# Patient Record
Sex: MALE
[De-identification: names, ages, dates, MRNs, and addresses within clinical notes are randomized per-mention and may not be internally consistent; named-entity substitution may affect disease eponyms.]

---

## 2023-05-10 ENCOUNTER — HOSPITAL ENCOUNTER (EMERGENCY)
Dept: HOSPITAL 97 - ER | Age: 58
Discharge: HOME | End: 2023-05-10
Payer: COMMERCIAL

## 2023-05-10 VITALS — SYSTOLIC BLOOD PRESSURE: 139 MMHG | OXYGEN SATURATION: 98 % | DIASTOLIC BLOOD PRESSURE: 77 MMHG | TEMPERATURE: 98.1 F

## 2023-05-10 DIAGNOSIS — I10: ICD-10-CM
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
## 2023-05-10 DIAGNOSIS — M25.552: Primary | ICD-10-CM

## 2023-05-10 DIAGNOSIS — M25.551: ICD-10-CM

## 2023-05-10 DIAGNOSIS — E11.9: ICD-10-CM

## 2023-05-10 PROCEDURE — 73521 X-RAY EXAM HIPS BI 2 VIEWS: CPT
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
## 2023-05-10 NOTE — XMS REPORT
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



               00                                 by mouth           [facility]



                                                  nightly as           l



                                                  needed           

 

     Ofloxacin      2022- No             5[drp]      Place 5           [facility]

[facility]



     (Floxin      2-19 04-06                          drops into           [facility]

ld



     Otic) 0.3 %      00:00: 00:00                          the right           

-



     otic      00   :00                           ear 2           [facility]



     Solution                                         times           l



                                                  daily Use           



                                                  for 5 days           

 

     Ibuprofen      2022      Yes       [id number] 600mg Q.[id number] Take 1    

       [facility]



     600 MG oral      2-01                          [id number] tablet           

[facility]



     Tablet      00:00:                          3D   (600 mg           -



               00                                 total) by           [facility]



                                                  mouth           l



                                                  every 8           



                                                  hours as           



                                                  needed FOR           



                                                  PAIN           

 

     Gabapentin      2022      Yes       [id number] 100mg      Take 1          

 [facility]



     100 MG oral      2-01                               capsule           [facility]

ld



     Capsule      00:00:                               (100 mg           -



               00                                 total) by           [facility]



                                                  mouth 3           l



                                                  times           



                                                  daily           

 

     Ibuprofen      2022      Yes       [id number] 600mg [id number] Take 1    

       [facility]



     600 MG oral      2-01                          [id number] tablet           

[facility]



     Tablet      00:00:                          3D   (600 mg           -



               00                                 total) by           [facility]



                                                  mouth           l



                                                  every 8           



                                                  hours as           



                                                  needed FOR           



                                                  PAIN           

 

     Ibuprofen      2022      Yes       [id number] 600mg Q.[id number] Take 1    

       [facility]



     600 MG oral      2-01                          [id number] tablet           

[facility]



     Tablet      00:00:                          3D   (600 mg           -



               00                                 total) by           [facility]



                                                  mouth           l



                                                  every 8           



                                                  hours as           



                                                  needed FOR           



                                                  PAIN           

 

     Ibuprofen      2022      Yes       [id number] 600mg Q.[id number] Take 1    

       [facility]



     600 MG oral      2-01                          [id number] tablet           

[facility]



     Tablet      00:00:                          3D   (600 mg           -



               00                                 total) by           [facility]



                                                  mouth           l



                                                  every 8           



                                                  hours as           



                                                  needed FOR           



                                                  PAIN           

 

     Gabapentin      2022- No        [id number] 100mg      Take 1         

  [facility]



     100 MG oral      2-01 12-19                          capsule           [facility]

[facility]



     Capsule      00:00: 00:00                          (100 mg           -



               00   :00                           total) by           [facility]



                                                  mouth 3           l



                                                  times           



                                                  daily           

 

     glipiZIDE 5      2022      Yes       [id number]           TAKE 1           

[facility]



     MG oral      1-30                               TABLET BY           [facility]



     Tablet      00:00:                               MOUTH ONCE           -



               00                                 DAILY           [facility]



                                                  BEFORE A           l



                                                  MEAL           

 

     glipiZIDE 5      2022- No        [id number]           TAKE 1          

 [facility]



     MG oral      1-30 -19                          TABLET BY           [facility]

d



     Tablet      00:00: 00:00                          MOUTH ONCE           -



               00   :00                           DAILY           [facility]



                                                  BEFORE A           l



                                                  MEAL           

 

     ondansetron      2022- No             4mg       4 mg, Slow          

 [facility]



     (ZOFRAN                                IV Push,           ity of



     (PF))      02:00: 01:55                          ONCE, 1           [place]



     injection 4      00   :00                           dose, On           [facility]

nu[facility]



     mg                                           Tue            [facility]



                                                  22           



                                                  at 2000,           



                                                  ASAP           

 

     morpHINE (4      2022- No             4mg       4 mg, Slow          

 [facility]



     mg/mL)                                IV Push,           ity of



     injection 4      02:00: 01:57                          ONCE, 1           [facility]

[facility]



     mg        00   :00                           dose, On           [facility]



                                                  Tue            [facility]



                                                  22           



                                                  at 2000,           



                                                  ASAP           

 

     Metformin      -0      Yes       [id number] 1000mg      Take 2           

[facility]



     HCl 500 MG      9-19                               tablets           [facility]

d



     oral Tablet      00:00:                               (1,000 mg           -



               00                                 total) by           [facility]



                                                  mouth in           l



                                                  the            



                                                  morning           



                                                  and 2           



                                                  tablets           



                                                  (1,000 mg           



                                                  total) in           



                                                  the            



                                                  evening.           



                                                  Take with           



                                                  meals.           

 

     Metformin            Yes       [id number] 1000mg      Take 2           

[facility]



     HCl 500 MG      9-19                               tablets           [facility]

d



     oral Tablet      00:00:                               (1,000 mg           -



               00                                 total) by           [facility]



                                                  mouth in           l



                                                  the            



                                                  morning           



                                                  and 2           



                                                  tablets           



                                                  (1,000 mg           



                                                  total) in           



                                                  the            



                                                  evening.           



                                                  Take with           



                                                  meals.           

 

     Metformin            Yes       [id number] 1000mg      Take 2           

[facility]



     HCl 500 MG      9-19                               tablets           [facility]

d



     oral Tablet      00:00:                               (1,000 mg           -



               00                                 total) by           [facility]



                                                  mouth in           l



                                                  the            



                                                  morning           



                                                  and 2           



                                                  tablets           



                                                  (1,000 mg           



                                                  total) in           



                                                  the            



                                                  evening.           



                                                  Take with           



                                                  meals.           

 

     Metformin            Yes       [id number] 1000mg      Take 2           

[facility]



     HCl 500 MG      9-19                               tablets           [facility]

d



     oral Tablet      00:00:                               (1,000 mg           -



               00                                 total) by           [facility]



                                                  mouth in           l



                                                  the            



                                                  morning           



                                                  and 2           



                                                  tablets           



                                                  (1,000 mg           



                                                  total) in           



                                                  the            



                                                  evening.           



                                                  Take with           



                                                  meals.           

 

     HYDROcodone      2022- No             1{tbl}      1 tablet,         

  [facility]



     -acetaminop                                Oral,           ity of



     hen (NORCO      23:45: 23:53                          ONCE, 1           [facility]

as



     5) 5-325 mg      00   :00                           dose, On           Medi

nuha



     tablet 1                                         Sat            [facility]



     tablet                                         22 at           



                                                  1845, ASAP           

 

     Dapaglifloz            Yes       [id number] 5mg       Take 5 mg        

   [facility]



     in                                       by mouth           [facility]



     Propanediol      00:00:                               daily           -



     (Farxiga) 5      00                                                [facility]



     MG oral                                                        l



     Tablet                                                        

 

     Dapaglifloz      2022- No        [id number] 5mg       Take 5 mg       

    [facility]



     in                                  by mouth           [facility]



     Propanediol      00:00: 00:00                          daily           -



     (Farxiga) 5      00   :00                                          [facility]



     MG oral                                                        l



     Tablet                                                        

 

     Ibuprofen      2022- No        [id number]           TAKE 1           

[facility]



     600 MG oral                                TABLET BY           [facility]

[facility]



     Tablet      00:00: 00:00                          MOUTH           -



               00   :00                           EVERY 8           [facility]



                                                  HOURS AS           l



                                                  NEEDED FOR           



                                                  PAIN           

 

     Gabapentin      2022- No                       TAKE 1           [facility]

ey



     100 MG oral                                CAPSULE BY           S

[facility]



     Capsule      00:00: 00:00                          MOUTH           -



               00   :00                           THREE           [facility]



                                                  TIMES           l



                                                  DAILY           

 

     Lisinopril      0      Yes       [id number] 20mg      Take 1           [facility]

[facility]



     20 MG oral      1-19                               tablet (20           [facility]

[facility]



     Tablet      00:00:                               mg total)           -



               00                                 by mouth           [facility]



                                                  daily           l

 

     Atorvastati            Yes       [id number] 10mg      Take 1           

[facility]



     n Calcium      1-19                               tablet (10           [facility]

[facility]



     10 MG oral      00:00:                               mg total)           -



     Tablet      00                                 by mouth           [facility]



                                                  daily           l

 

     Lisinopril            Yes       [id number] 20mg      Take 1           [facility]

[facility]



     20 MG oral      1-19                               tablet (20           [facility]

[facility]



     Tablet      00:00:                               mg total)           -



               00                                 by mouth           [facility]



                                                  daily           l

 

     Atorvastati            Yes       [id number] 10mg      Take 1           

[facility]



     n Calcium      1-19                               tablet (10           [facility]

[facility]



     10 MG oral      00:00:                               mg total)           -



     Tablet      00                                 by mouth           [facility]



                                                  daily           l

 

     Lisinopril      0      Yes       [id number] 20mg      Take 1           [facility]

[facility]



     20 MG oral      1-19                               tablet (20           [facility]

[facility]



     Tablet      00:00:                               mg total)           -



               00                                 by mouth           [facility]



                                                  daily           l

 

     Atorvastati            Yes       [id number] 10mg      Take 1           

[facility]



     n Calcium      1-19                               tablet (10           [facility]

[facility]



     10 MG oral      00:00:                               mg total)           -



     Tablet      00                                 by mouth           [facility]



                                                  daily           l

 

     Lisinopril      0      Yes       [id number] 20mg      Take 1           [facility]

[facility]



     20 MG oral      1-19                               tablet (20           [facility]

[facility]



     Tablet      00:00:                               mg total)           



               00                                 by mouth           



                                                  daily           

 

     Atorvastati      0      Yes       [id number] 10mg      Take 1           

[facility]



     n Calcium      1-19                               tablet (10           [facility]

[facility]



     10 MG oral      00:00:                               mg total)           



     Tablet      00                                 by mouth           



                                                  daily           

 

     Metformin      0      Yes       [id number] 500mg      Take 1           [facility]

[facility]



     HCl 500 MG      1-19                               tablet           [facility]



     oral Tablet      00:00:                               (500 mg           



               00                                 total) by           



                                                  mouth 2           



                                                  times           



                                                  daily           



                                                  (with           



                                                  meals)           

 

     glipiZIDE 5      -0      Yes       [id number] 5mg       Take 1           

[facility]



     MG oral      1-19                               tablet (5           [facility]



     Tablet      00:00:                               mg total)           



               00                                 by mouth           



                                                  daily           



                                                  (before a           



                                                  meal)           

 

     Ibuprofen      -0      Yes       [id number] 600mg Q8H  Take 1           

[facility]



     600 MG oral      1-19                               tablet           [facility]

d



     Tablet      00:00:                               (600 mg           



               00                                 total) by           



                                                  mouth           



                                                  every 8           



                                                  hours as           



                                                  needed for           



                                                  pain           

 

     Lisinopril            Yes       [id number] 20mg      Take 1           [facility]

[facility]



     20 MG oral      1-19                               tablet (20           [facility]

[facility]



     Tablet      00:00:                               mg total)           



               00                                 by mouth           



                                                  daily           

 

     Atorvastati            Yes       [id number] 10mg      Take 1           

[facility]



     n Calcium      1-19                               tablet (10           [facility]

[facility]



     10 MG oral      00:00:                               mg total)           



     Tablet      00                                 by mouth           



                                                  daily           

 

     Metformin      0      Yes       [id number] 500mg      Take 1           [facility]

[facility]



     HCl 500 MG      1-19                               tablet           [facility]



     oral Tablet      00:00:                               (500 mg           



               00                                 total) by           



                                                  mouth 2           



                                                  times           



                                                  daily           



                                                  (with           



                                                  meals)           

 

     glipiZIDE 5            Yes       [id number] 5mg       Take 1           

[facility]



     MG oral      1-19                               tablet (5           [facility]



     Tablet      00:00:                               mg total)           



               00                                 by mouth           



                                                  daily           



                                                  (before a           



                                                  meal)           

 

     Ibuprofen      0      Yes       [id number] 600mg Q.[id number] Take 1     

      [facility]



     600 MG oral      1-19                          [id number] tablet           

[facility]



     Tablet      00:00:                          3D   (600 mg           



               00                                 total) by           



                                                  mouth           



                                                  every 8           



                                                  hours as           



                                                  needed for           



                                                  pain           

 

     HYDROcodone            Yes                      TAKE 1           [facility]

[facility]



     -Acetaminop      1-12                               TABLET BY           [facility]



     hen 7.5-325      00:00:                               MOUTH           -



     MG oral      00                                 TWICE           [facility]



     Tablet                                         DAILY FOR           l



                                                  28 DAYS           

 

     Tizanidine      0      Yes                      TAKE 1           [facility]

y



     HCl 4 MG      1-12                               TABLET BY           [facility]

d



     oral Tablet      00:00:                               MOUTH           -



               00                                 TWICE           [facility]



                                                  DAILY FOR           l



                                                  28 DAYS           

 

     HYDROcodone      -      Yes                      TAKE 1           [facility]

[facility]



     -Acetaminop      1-12                               TABLET BY           [facility]

[facility]



     hen 7.5-325      00:00:                               MOUTH           -



     MG oral      00                                 TWICE           [facility]



     Tablet                                         DAILY FOR           l



                                                  28 DAYS           

 

     Tizanidine            Yes                      TAKE 1           [facility]

y



     HCl 4 MG      1-12                               TABLET BY           [facility]

d



     oral Tablet      00:00:                               MOUTH           -



               00                                 TWICE           [facility]



                                                  DAILY FOR           l



                                                  28 DAYS           

 

     HYDROcodone            Yes                      TAKE 1           [facility]

[facility]



     -Acetaminop      1-12                               TABLET BY           [facility]

[facility]



     hen 7.5-325      00:00:                               MOUTH           -



     MG oral      00                                 TWICE           [facility]



     Tablet                                         DAILY FOR           l



                                                  28 DAYS           

 

     Tizanidine            Yes                      TAKE 1           [facility]

y



     HCl 4 MG      1-12                               TABLET BY           [facility]

d



     oral Tablet      00:00:                               MOUTH           -



               00                                 TWICE           [facility]



                                                  DAILY FOR           l



                                                  28 DAYS           

 

     HYDROcodone            Yes                      TAKE 1           [facility]

[facility]



     -Acetaminop      1-12                               TABLET BY           [facility]

[facility]



     hen 7.5-325      00:00:                               MOUTH           -



     MG oral      00                                 TWICE           [facility]



     Tablet                                         DAILY FOR           l



                                                  28 DAYS           

 

     Tizanidine            Yes                      TAKE 1           [facility]

y



     HCl 4 MG      1-12                               TABLET BY           [facility]

d



     oral Tablet      00:00:                               MOUTH           -



               00                                 TWICE           [facility]



                                                  DAILY FOR           l



                                                  28 DAYS           

 

     HYDROcodone            Yes                      TAKE 1           [facility]

[facility]



     -Acetaminop      1-12                               TABLET BY           [facility]

[facility]



     hen 7.5-325      00:00:                               MOUTH           



     MG oral      00                                 TWICE           



     Tablet                                         DAILY FOR           



                                                  28 DAYS           

 

     Tizanidine            Yes                      TAKE 1           [facility]

y



     HCl 4 MG      1-12                               TABLET BY           [facility]

d



     oral Tablet      00:00:                               MOUTH           



               00                                 TWICE           



                                                  DAILY FOR           



                                                  28 DAYS           

 

     HYDROcodone      0      Yes                      TAKE 1           [facility]

[facility]



     -Acetaminop      1-12                               TABLET BY           [facility]

[facility]



     hen 7.5-325      00:00:                               MOUTH           



     MG oral      00                                 TWICE           



     Tablet                                         DAILY FOR           



                                                  28 DAYS           

 

     Tizanidine            Yes                      TAKE 1           [facility]

y



     HCl 4 MG      1-12                               TABLET BY           [facility]

d



     oral Tablet      00:00:                               MOUTH           



               00                                 TWICE           



                                                  DAILY FOR           



                                                  28 DAYS           

 

     Polyethyl            Yes       [id number] 2[drp]      Place 2         

  [facility]



     Glycol-Prop      9-09                               drops into           [facility]



     yl Glycol      00:00:                               the right           -



     (EQ       00                                 eye as           [facility]



     Lubricant                                         needed           l



     Eye Drops)                                                        



     0.4-0.3 %                                                        



     ophthalmic                                                        



     Solution                                                        

 

     Polyethyl            Yes       [id number] 2[drp]      Place 2         

  [facility]



     Glycol-Prop      9-09                               drops into           Se

[facility]



     yl Glycol      00:00:                               the right           -



     (EQ       00                                 eye as           [facility]



     Lubricant                                         needed           l



     Eye Drops)                                                        



     0.4-0.3 %                                                        



     ophthalmic                                                        



     Solution                                                        

 

     Polyethyl      -      Yes       [id number] 2[drp]      Place 2         

  [facility]



     Glycol-Prop      9-09                               drops into           Se

[facility]



     yl Glycol      00:00:                               the right           -



     (EQ       00                                 eye as           [facility]



     Lubricant                                         needed           l



     Eye Drops)                                                        



     0.4-0.3 %                                                        



     ophthalmic                                                        



     Solution                                                        

 

     Atorvastati      0      Yes       [id number] 10mg      Take 1           

[facility]



     n Calcium      9-09                               tablet (10           [facility]

[facility]



     10 MG oral      00:00:                               mg total)           



     Tablet      00                                 by mouth           



                                                  daily           

 

     Lisinopril            Yes       [id number] 20mg      Take 1           [facility]

[facility]



     20 MG oral      9-09                               tablet (20           [facility]

[facility]



     Tablet      00:00:                               mg total)           



               00                                 by mouth           



                                                  daily           

 

     Metformin      -0      Yes       [id number] 500mg      Take 1           [facility]

[facility]



     HCl 500 MG      9-09                               tablet           [facility]



     oral Tablet      00:00:                               (500 mg           



               00                                 total) by           



                                                  mouth 2           



                                                  times           



                                                  daily           



                                                  (with           



                                                  meals)           

 

     glipiZIDE 5            Yes       [id number] 5mg       Take 1           

[facility]



     MG oral      9-09                               tablet (5           [facility]



     Tablet      00:00:                               mg total)           



               00                                 by mouth           



                                                  daily           



                                                  (before a           



                                                  meal)           

 

     Ibuprofen            Yes       [id number] 600mg Q6H  Take 1           

[facility]



     600 MG oral      9-09                               tablet           [facility]

d



     Tablet      00:00:                               (600 mg           



               00                                 total) by           



                                                  mouth           



                                                  every 6           



                                                  hours as           



                                                  needed           

 

     Polyethyl      -      Yes       [id number] 2[drp]      Place 2         

  [facility]



     Glycol-Prop      9-09                               drops into           Se

[facility]



     yl Glycol      00:00:                               the right           



     (EQ       00                                 eye as           



     Lubricant                                         needed           



     Eye Drops)                                                        



     0.4-0.3 %                                                        



     ophthalmic                                                        



     Solution                                                        

 

     Polyethyl      -0      Yes       [id number] 2[drp]      Place 2         

  [facility]



     Glycol-Prop      9-09                               drops into           Se

[facility]



     yl Glycol      00:00:                               the right           



     (EQ       00                                 eye as           



     Lubricant                                         needed           



     Eye Drops)                                                        



     0.4-0.3 %                                                        



     ophthalmic                                                        



     Solution                                                        

 

     Polyethyl      -0      Yes       [id number] 2[drp]      Place 2         

  [facility]



     Glycol-Prop      9-09                               drops into           Se

[facility]



     yl Glycol      00:00:                               the right           



     (EQ       00                                 eye as           



     Lubricant                                         needed           



     Eye Drops)                                                        



     0.4-0.3 %                                                        



     ophthalmic                                                        



     Solution                                                        

 

     Polyethyl      2023- No        [id number] 2[drp]      Place 2        

   [facility]



     Glycol-Prop                                drops into           S

[facility]



     yl Glycol      00:00: 00:00                          the right           -



     (EQ       00   :00                           eye as           [facility]



     Lubricant                                         needed           l



     Eye Drops)                                                        



     0.4-0.3 %                                                        



     ophthalmic                                                        



     Solution                                                        

 

     Atorvastati      2022- No        [id number] 10mg      Take 1          

 [facility]



     n Calcium                                tablet (10           [facility]

[facility]



     10 MG oral      00:00: 00:00                          mg total)           



     Tablet      00   :00                           by mouth           



                                                  daily           

 

     Lisinopril      2022- No        [id number] 20mg      Take 1           

[facility]



     20 MG oral                                tablet (20           [facility]

[facility]



     Tablet      00:00: 00:00                          mg total)           



               00   :00                           by mouth           



                                                  daily           

 

     Metformin      2022- No        [id number] 500mg      Take 1           

[facility]



     HCl 500 MG                                tablet           [facility]

d



     oral Tablet      00:00: 00:00                          (500 mg           



               00   :00                           total) by           



                                                  mouth 2           



                                                  times           



                                                  daily           



                                                  (with           



                                                  meals)           

 

     glipiZIDE 5      2022- No        [id number] 5mg       Take 1          

 [facility]



     MG oral                                tablet (5           [facility]

d



     Tablet      00:00: 00:00                          mg total)           



               00   :00                           by mouth           



                                                  daily           



                                                  (before a           



                                                  meal)           

 

     Ibuprofen      2022- No        [id number] 600mg Q6H  Take 1          

 [facility]



     600 MG oral                                tablet           [facility]

[facility]



     Tablet      00:00: 00:00                          (600 mg           



               00   :00                           total) by           



                                                  mouth           



                                                  every 6           



                                                  hours as           



                                                  needed           

 

     Gabapentin            Yes       [id number] 100mg      Take 1           

[facility]



     100 MG oral      7-12                               capsule           [facility]

ld



     Capsule      00:00:                               (100 mg           



               00                                 total) by           



                                                  mouth 3           



                                                  times           



                                                  daily           

 

     Gabapentin            Yes       [id number] 100mg      Take 1           

[facility]



     100 MG oral      7-12                               capsule           [facility]

[facility]



     Capsule      00:00:                               (100 mg           



               00                                 total) by           



                                                  mouth 3           



                                                  times           



                                                  daily           

 

     Gabapentin            Yes       [id number] 100mg      Take 1           

[facility]



     100 MG oral      7-12                               capsule           [facility]

[facility]



     Capsule      00:00:                               (100 mg           



               00                                 total) by           



                                                  mouth 3           



                                                  times           



                                                  daily           

 

     glipiZIDE 5      2021- No        [id number] 5mg       Take 1          

 [facility]



     MG oral                                tablet (5           [facility]

d



     Tablet      00:00: 00:00                          mg total)           



               00   :00                           by mouth           



                                                  daily           



                                                  (before a           



                                                  meal)           

 

     Lisinopril      2021- No        [id number] 20mg      Take 1           

[facility]



     20 MG oral                                tablet (20           [facility]

[facility]



     Tablet      00:00: 00:00                          mg total)           



               00   :00                           by mouth           



                                                  daily           

 

     Atorvastati      2021- No        [id number] 10mg      Take 1          

 [facility]



     n Calcium      -                          tablet (10           [facility]

[facility]



     10 MG oral      00:00: 00:00                          mg total)           



     Tablet      00   :00                           by mouth           



                                                  daily           

 

     Tramadol            Yes            1{tbl} Q.25D Take 1           [facility]

[facility]



     HCl 50 MG      4-08                               tablet by           [facility]

[facility]



     oral Tablet      00:00:                               mouth           -



               00                                 every 6           [facility]



                                                  hours as           l



                                                  needed           

 

     Temazepam      -0      Yes            30mg      Take 30 mg           [facility]

[facility]



     30 MG oral      4-08                               by mouth           [facility]

[facility]



     Capsule      00:00:                               at bedtime           -



               00                                                [facility]



                                                                 l

 

     Tramadol      -0      Yes            1{tbl} Q6H  Take 1           [facility]

y



     HCl 50 MG      4-08                               tablet by           [facility]

[facility]



     oral Tablet      00:00:                               mouth           



               00                                 every 6           



                                                  hours as           



                                                  needed           

 

     Temazepam      -0      Yes            30mg      Take 30 mg           [facility]

[facility]



     30 MG oral      4-08                               by mouth           [facility]

ld



     Capsule      00:00:                               at bedtime           



               00                                                

 

     Tramadol      -0      Yes            1{tbl} Q6H  Take 1           [facility]

y



     HCl 50 MG      4-08                               tablet by           [facility]

[facility]



     oral Tablet      00:00:                               mouth           



               00                                 every 6           



                                                  hours as           



                                                  needed           

 

     Temazepam      -0      Yes            30mg      Take 30 mg           [facility]

[facility]



     30 MG oral      4-08                               by mouth           [facility]

ld



     Capsule      00:00:                               at bedtime           



               00                                                

 

     Tramadol      -0      Yes            1{tbl} Q.25D Take 1           [facility]

[facility]



     HCl 50 MG      4-08                               tablet by           [facility]

[facility]



     oral Tablet      00:00:                               mouth           



               00                                 every 6           



                                                  hours as           



                                                  needed           

 

     Temazepam      -0      Yes            30mg      Take 30 mg           [facility]

[facility]



     30 MG oral      4-08                               by mouth           [facility]

ld



     Capsule      00:00:                               at bedtime           



               00                                                

 

     Tramadol      2021-0 2022- No             1{tbl} Q.25D Take 1           [facility]

[facility]



     HCl 50 MG                                tablet by           [facility]

[facility]



     oral Tablet      00:00: 00:00                          mouth           -



               00   :00                           every 6           [facility]



                                                  hours as           l



                                                  needed           

 

     Temazepam      2022- No             30mg      Take 30 mg           [facility]

[facility]



     30 MG oral                                by mouth           [facility]

[facility]



     Capsule      00:00: 00:00                          at bedtime           -



               00   :00                                          [facility]



                                                                 l

 

     ibuprofen      2020      Yes       [id number] 600mg      Take 1           

[facility]



     600 mg      1-12                               tablet by           [facility]



     tablet      00:00:                               mouth           [place]



               00                                 every 6           [facility]



                                                  (six)           [facility]



                                                  hours as           



                                                  needed for           



                                                  Pain           



                                                  (scale           



                                                  4-6).           

 

     ibuprofen      2020      Yes       [id number] 600mg      Take 1           

[facility]



     600 mg      1-12                               tablet by           [facility]



     tablet      00:00:                               mouth           [place]



               00                                 every 6           [facility]



                                                  (six)           [facility]



                                                  hours as           



                                                  needed for           



                                                  Pain           



                                                  (scale           



                                                  4-6).           

 

     Ibuprofen      2020 No             600mg Q6H  Take 600           [facility]

[facility]



     600 MG oral       09-09                          mg by           [facility]

d



     Tablet      00:00: 00:00                          mouth           



               00   :00                           every 6           



                                                  hours as           



                                                  needed           

 

     traMADol      2019      Yes       [id number] 50mg      Take 1           [facility]

[facility]



     (ULTRAM) 50      2-02                               tablet by           [facility]

 of



     mg tablet      00:00:                               mouth           [place]



               00                                 every 6           [facility]



                                                  (six)           [facility]



                                                  hours as           



                                                  needed for           



                                                  Pain           



                                                  (scale           



                                                  7-10).           

 

     methocarbam      2019      Yes       [id number] 500mg      Take 1          

 [facility]



     ol        2-02                               tablet by           [facility]



     (ROBAXIN)      00:00:                               mouth           [place]



     500 mg      00                                 every 6           [facility]



     tablet                                         (six)           [facility]



                                                  hours as           



                                                  needed           



                                                  (SPASM).           

 

     traMADol      2019      Yes       [id number] 50mg      Take 1           [facility]

[facility]



     (ULTRAM) 50      2-02                               tablet by           [facility]

 of



     mg tablet      00:00:                               mouth           [place]



               00                                 every 6           [facility]



                                                  (six)           [facility]



                                                  hours as           



                                                  needed for           



                                                  Pain           



                                                  (scale           



                                                  7-10).           

 

     methocarbam      2019      Yes       [id number] 500mg      Take 1          

 Univers



     ol        2-02                               tablet by           ity of



     (ROBAXIN)      00:00:                               mouth           [place]



     500 mg      00                                 every 6           Medical



     tablet                                         (six)           Branch



                                                  hours as           



                                                  needed           



                                                  (SPASM).           

 

     ibuprofen            Yes            600mg      Take 1           Unive

rs



     (MOTRIN)      5-03                               tablet by           ity of



     600 mg      00:00:                               mouth           [place]



     tablet      00                                 every 6           Medical



                                                  (six)           Branch



                                                  hours as           



                                                  needed for           



                                                  Pain           



                                                  (scale           



                                                  4-6).           

 

     ibuprofen      2016-0      Yes            600mg      Take 1           Unive

rs



     (MOTRIN)      5-03                               tablet by           ity of



     600 mg      00:00:                               mouth           [place]



     tablet      00                                 every 6           Medical



                                                  (six)           Branch



                                                  hours as           



                                                  needed for           



                                                  Pain           



                                                  (scale           



                                                  4-6).           







Immunizations







           Ordered Immunization Filled Immunization Date       Status     Commen

ts   Source



           Name       Name                                        

 

           Influenza Virus            2022 Completed             Gabi Se

ybold



           Vaccine, age 6            00:00:00                         - External



           months and up                                             

 

           Influenza Virus            2022 Completed             Gabi Se

ybold



           Vaccine, age 6            00:00:00                         - External



           months and up                                             

 

           Influenza Virus            2022 Completed             Gabi Se

ybold



           Vaccine, age 6            00:00:00                         - External



           months and up                                             

 

           Influenza Virus            2021 Completed             Gabi Se

ybold



           Vaccine, age 6            00:00:00                         



           months and up                                             

 

           Influenza Virus            2021 Completed             Gabi Se

ybold



           Vaccine, age 6            00:00:00                         - External



           months and up                                             

 

           Influenza Virus            2021 Completed             Gabi Se

ybold



           Vaccine, age 6            00:00:00                         - External



           months and up                                             

 

           Influenza Virus            2021 Completed             Gabi Se

ybold



           Vaccine, age 6            00:00:00                         - External



           months and up                                             

 

           Influenza Virus            2021 Completed             Gabi Se

ybold



           Vaccine, age 6            00:00:00                         - External



           months and up                                             

 

           Influenza Virus            2021 Completed             Gabi Se

ybold



           Vaccine, age 6            00:00:00                         



           months and up                                             

 

           Influenza Virus            2021 Completed             Gabi Se

ybold



           Vaccine, age 6            00:00:00                         



           months and up                                             







Vital Signs







             Vital Name   Observation Time Observation Value Comments     Source

 

             Systolic blood 2023 13:37:00 127 mm[Hg]                Gabi Srybold -



             pressure                                            External

 

             Diastolic blood 2023 13:37:00 79 mm[Hg]                 Otoniel murdock Seybold -



             pressure                                            External

 

             Heart rate   2023 13:37:00 78 /min                   Gabi ASHBY

eybold -



                                                                 External

 

             Body temperature 2023 13:37:00 36.67 Kika                 Mattie mattson Seybold -



                                                                 External

 

             Respiratory rate 2023 13:37:00 14 /min                   Mattie

ey Seybold -



                                                                 External

 

             Body height  2023 13:37:00 154.9 cm                  Gabi ASHBY

eybold -



                                                                 External

 

             Body weight  2023 13:37:00 80.287 kg                 Gabi S

eybold -



                                                                 External

 

             BMI          2023 13:37:00 33.44 kg/m2               Gabi ASHBY

eybold -



                                                                 External

 

             Oxygen saturation in 2023 13:37:00 99 /min                   

Gabi Srybold -



             Arterial blood by                                        External



             Pulse oximetry                                        

 

             Body height  2022 18:55:00 154.9 cm                  Gabi ASHBY

eybold -



                                                                 External

 

             Body weight  2022 18:55:00 79.379 kg                 Gabi ASHBY

eybold -



                                                                 External

 

             BMI          2022 18:55:00 33.07 kg/m2               Gabi ASHBY

eybold -



                                                                 External

 

             Systolic blood 2022 14:00:00 118 mm[Hg]                Gabi

 Seybold -



             pressure                                            External

 

             Diastolic blood 2022 14:00:00 72 mm[Hg]                 Abielse

y Seybold -



             pressure                                            External

 

             Heart rate   2022 14:00:00 100 /min                  Gabi ASHBY

eybold -



                                                                 External

 

             Body temperature 2022 14:00:00 36.56 Kika                 Mattie

ey Seybold -



                                                                 External

 

             Respiratory rate 2022 14:00:00 16 /min                   Mattie

ey Seybold -



                                                                 External

 

             Body height  2022 14:00:00 154.9 cm                  Gabi ASHBY

eybold -



                                                                 External

 

             Body weight  2022 14:00:00 79.379 kg                 Gabi ASHBY

eybold -



                                                                 External

 

             BMI          2022 14:00:00 33.07 kg/m2               Gabi ASHBY

eybold -



                                                                 External

 

             Systolic blood 2022 17:24:00 122 mm[Hg]                Gabi

 Seybold -



             pressure                                            External

 

             Diastolic blood 2022 17:24:00 64 mm[Hg]                 Kelse

y Seybold -



             pressure                                            External

 

             Heart rate   2022 17:24:00 93 /min                   Gabi S

eybold -



                                                                 External

 

             Body temperature 2022 17:24:00 37.39 Kika                 Mattie

ey Seybold -



                                                                 External

 

             Respiratory rate 2022 17:24:00 14 /min                   Mattie Bell -



                                                                 External

 

             Body height  2022 17:24:00 154.9 cm                  Gabi mattsonbonereida -



                                                                 External

 

             Body weight  2022 17:24:00 80.74 kg                  Gabi mattsonbonereida -



                                                                 External

 

             BMI          2022 17:24:00 33.63 kg/m2               Gabi dillard -



                                                                 External

 

             Respiratory rate 2022 04:00:00 20 /min                   [facility]

[facility]



                                                                 [facility] [place] [place]

 

             Oxygen saturation in 2022 04:00:00 99 /min                   

[facility]



             Arterial blood by                                        [place] [facility]

nuha



             Pulse oximetry                                        Branch

 

             Systolic blood 2022 04:00:00 136 mm[Hg]                Univer

sity of



             pressure                                            [facility] [place] [place]

 

             Diastolic blood 2022 04:00:00 88 mm[Hg]                 Unive

rsity of



             [facility]

 

             Heart rate   2022 04:00:00 92 /min                   Universi

ty of



                                                                 [facility] [place] [place]

 

             Body temperature 2022 01:32:00 36.56 Kika                 Univ

ersity of



                                                                 [facility] [place]

 

             Body height  2022 01:32:00 154.9 cm                  Universi

ty of



                                                                 [place] [facility]



                                                                 Branch

 

             Body weight  2022 01:32:00 72.576 kg                 Universi

ty of



                                                                 [place] [facility]



                                                                 [place]

 

             BMI          2022 01:32:00 30.23 kg/m2               Universi

ty of



                                                                 [place] [facility]



                                                                 [place]

 

             Systolic blood 2022 23:05:00 149 mm[Hg]                Univer

sity of



             pressure                                            [facility] [place]



                                                                 Branch

 

             Diastolic blood 2022 23:05:00 90 mm[Hg]                 Unive

rsity of



             pressure                                            [facility] [place] [place]

 

             Heart rate   2022 23:05:00 83 /min                   Universi

ty of



                                                                 [place] [facility]



                                                                 Branch

 

             Body temperature 2022 23:05:00 37.17 Kika                 Univ

ersity of



                                                                 [facility] [place]



                                                                 Branch

 

             Respiratory rate 2022 23:05:00 18 /min                   Univ

ersity of



                                                                 [facility] [place]



                                                                 Branch

 

             Body height  2022 23:05:00 154.9 cm                  Universi

ty of



                                                                 [facility] [place]



                                                                 Branch

 

             Body weight  2022 23:05:00 78.926 kg                 Universi

ty of



                                                                 [place] [facility]



                                                                 Branch

 

             BMI          2022 23:05:00 32.88 kg/m2               Universi

ty of



                                                                 [facility] [place] [place]

 

             Oxygen saturation in 2022 23:05:00 96 /min                   

[facility] of



             Arterial blood by                                        [place] [facility]

nuha



             Pulse oximetry                                        Branch

 

             Systolic blood 2022 15:33:00 126 mm[Hg]                Gabi

 Seybold



             pressure                                            

 

             Diastolic blood 2022 15:33:00 78 mm[Hg]                 Kelse

y Seybold



             pressure                                            

 

             Heart rate   2022 15:33:00 78 /min                   Gabi S

eybold

 

             Body temperature 2022 15:33:00 36.83 Kika                 Mattie

ey Seybold

 

             Respiratory rate 2022 15:33:00 20 /min                   Mattie

ey Seybold

 

             Body height  2022 15:33:00 154.9 cm                  Gabi S

eybold

 

             Body weight  2022 15:33:00 80.287 kg                 Gabi S

eybold

 

             BMI          2022 15:33:00 33.44 kg/m2               Gabi S

eybold

 

             Oxygen saturation in 2022 15:33:00 98 /min                   

Gabi ybold



             Arterial blood by                                        



             Pulse oximetry                                        

 

             Systolic blood 2022 16:03:00 120 mm[Hg]                Gabi

 Seybold



             pressure                                            

 

             Diastolic blood 2022 16:03:00 70 mm[Hg]                 Kelse

y Seybold



             pressure                                            

 

             Heart rate   2022 16:03:00 98 /min                   Gabi S

eybold

 

             Body temperature 2022 16:03:00 36.17 Kika                 Mattie

ey Seybold

 

             Respiratory rate 2022 16:03:00 14 /min                   Mattie

ey Seybold

 

             Body height  2022 16:03:00 154.9 cm                  Gabi S

eybold

 

             Body weight  2022 16:03:00 82.555 kg                 Gabi S

eybold

 

             BMI          2022 16:03:00 34.39 kg/m2               Gabi S

eybold

 

             Systolic blood 2021 14:29:00 126 mm[Hg]                Gabi

 Seybold



             pressure                                            

 

             Diastolic blood 2021 14:29:00 72 mm[Hg]                 Kelse

y Seybold



             pressure                                            

 

             Heart rate   2021 14:29:00 65 /min                   Gabi dillard

 

             Body temperature 2021 14:29:00 36.61 Kika                 Mattie Bell

 

             Respiratory rate 2021 14:29:00 14 /min                   Mattie Bell

 

             Body height  2021 14:29:00 154.9 cm                  Gabi dillard

 

             Body weight  2021 14:29:00 81.012 kg                 Gabi dillard

 

             BMI          2021 14:29:00 33.75 kg/m2               Gabi dillard

 

             Oxygen saturation in 2021 14:29:00 98 /min                   

Gabi Bell



             Arterial blood by                                        



             Pulse oximetry                                        







Procedures







                Procedure       Date / Time Performed Performing Clinician [facility]

jaelyn

 

                CT CERVICAL SPINE WO 2022 02:36:06 Negro Esposito  [facility]



                CONTRAST                                        Medical Branch

 

                CT HEAD WO CONTRAST 2022 02:36:06 Negro Esposito  [facility]

 

                CT LUMBAR SPINE WO 2022 02:36:06 Negro Esposito  [facility]



                CONTRAST                                        Medical Branch

 

                CT THORACIC SPINE WO 2022 02:36:06 Negro Esposito  [facility]



                CONTRAST                                        Medical Branch

 

                XR CHEST 1 VW   2022 02:35:46 Negro Esposito  [place]

f [facility] [place] [place]

 

                XR KNEE <3 VW RIGHT 2022 02:35:46 Negro Esposito  [facility]

 

                CT CERVICAL SPINE WO 2022 00:31:36 Gutierrez Love [facility]



                CONTRAST                                        Medical Branch

 

                CT LUMBAR SPINE WO 2022 00:31:36 Gutierrez Love [facility]



                CONTRAST                                        Medical Branch

 

                CT THORACIC SPINE WO 2022 00:31:36 Gutierrez Love [facility]



                CONTRAST                                        Medical Branch

 

                CONSENT/REFUSAL FOR 2022 22:53:02 Doctor Unassigned, No Un

[facility]



                DIAGNOSIS AND                   Name            Medical Branch



                TREATMENT                                       







Encounters







        Start   End     Encounter Admission Attending Care    Care    Encounter 

Source



        Date/Time Date/Time Type    Type    Clinicians Facility Department ID   

   

 

        2023 Outpatient         PREZASGABI  [id number]

55 Gabi



        08:00:00 08:00:00                 YUMI                          Seybol

d

 

        2023 Outpatient                 GABI HUDSON  [id number]

98 Gabi



        00:00:00 00:00:00                                                 Seybol

d

 

        2023 Outpatient         PREZAS, GABI HUDSON  [id number]

37 Gabi



        00:00:00 00:00:00                 YUMI                          Seybol

d

 

        2023 Outpatient         PREZASGABI  [id number]

71 Gabi



        09:45:00 09:45:00                 YUMI                          Seybol

d

 

        2023 Outpatient         LAB90   GABI HUDSON  [id number]

81 Gabi



        09:00:00 09:00:00                                                 Seybol

d

 

        2023 Outpatient         PREZASGABI  [id number]

78 Gabi



        10:00:00 10:00:00                 YUMI                          Seybol

d

 

        2023 Outpatient         PREZASGABI  [id number]

97 Gabi



        00:00:00 00:00:00                 YUMI                          Seybol

d

 

        2023 Outpatient                 GABI HUDSON  [id number]

63 Gabi



        00:00:00 00:00:00                                                 Seybol

d

 

        2023 Outpatient         GABI CORNELIUS  [id number]

931 Gabi



        00:00:00 00:00:00                 CANDY                           Seybol

d

 

        2023-02-15 2023-02-15 Outpatient                 GABI HUDSON  [id number]

70 Gabi



        00:00:00 00:00:00                                                 Seybol

d

 

        2023 Outpatient         GABI RUSHING  [id number]

59 Gabi



        15:00:00 15:00:00                 YUMI                          Seybol

d

 

        2023 Outpatient         GABI CORNELIUS  [id number]

299 Gabi



        00:00:00 00:00:00                 CANDY                           Seybol

d

 

        2022 Outpatient         GABI RUSHING  [id number]

73 Gabi



        13:30:00 13:30:00                 YUMI                          Seybol

d

 

        2022 Outpatient         GABI GIBBS  [id number]

12 Gabi



        13:30:00 13:30:00                 JANETH                         Seybo

ld

 

        2022 Outpatient         LAB90   GABI HUDSON  [id number]

20 Gabi



        09:00:00 09:00:00                                                 Seybol

d

 

        2022 Outpatient         GABI RUSHING  [id number]

87 Gabi



        08:00:00 08:00:00                 YUMI                          Seybol

d

 

        2022 Outpatient         GABI GIBBS  [id number]

90 Gabi



        00:00:00 00:00:00                 JANETH                         Seybo

ld

 

        2022 Outpatient                 GABI HUDSON  [id number]

77 Gabi



        00:00:00 00:00:00                                                 Seybol

d

 

        2022-12-15 2022-12-15 Outpatient         GABI RUSHING  [id number]

36 Gabi



        10:00:00 10:00:00                 YUMI                          Seybol

d

 

        2022 Outpatient                 GABI HUDSON  [id number]

65 Gabi



        00:00:00 00:00:00                                                 Seybol

d

 

        2022 Outpatient                 GABI HUDSON  [id number]

00 Gabi



        00:00:00 00:00:00                                                 Seybol

d

 

        2022 Outpatient         GABI RUSHING  [id number]

95 Gabi



        00:00:00 00:00:00                 YUMI                          Seybol

d

 

        2022 Outpatient         GABI CORNELIUS  [id number]

588 Gabi



        00:00:00 00:00:00                 CANDY                           Seybol

d

 

        2022 Outpatient         GABI RUSHING  [id number]

95 Gabi



        00:00:00 00:00:00                 YUMI                          Seybol

d

 

        2022 Outpatient         GABI RUSHING  [id number]

70 Gabi



        11:15:00 11:15:00                 YUMI                          Seybol

d

 

        [facility]     [id number]

50 Univers



        19:36:00 22:59:00                 NEGRO                           [facility]

 

        2022 [facility]         [facility]    [id number]

5649 Univers



        19:36:00 22:59:00                 [facility] [id number]         i

ty of



                                                [place] [id number]         [facility]  [id number]         [address]

 

        2022 Outpatient         GABI RUSHING  [id number]

35 Gabi



        00:00:00 00:00:00                 YUMI                          Seybol

d

 

        2022 Outpatient         GABI RUSHING  [id number]

85 Gabi



        00:00:00 00:00:00                 YUMI                          Seybol

d

 

        2022 Emergency X       [facility]    ERT     [id number]

360 Univers



        18:07[facility]00 20[facility]47:00                 [facility]

 

        [facility]         [facility]    [id number]

[id number] Univers



        18:07:00 20:47:00                 [facility] [id number]         i

ty of



                                                [place] [id number]         [facility]  [id number]         [address]

 

        2022 Outpatient         LAB90   GABI HUDSON  [id number]

25 Gabi



        09:30:00 09:30:00                                                 Seybol

d

 

        2022 Outpatient         GABI RUSHING  [id number]

17 Gabi



        09:00:00 09:00:00                 YUMI                          Seybol

d

 

        2022 Outpatient         GABI RUSHING  [id number]

91 Gabi



        10:00:00 10:00:00                 YUMI                          Seybol

d

 

        2022 Outpatient         GABI CORNELIUS  [id number]

541 Gabi



        00:00:00 00:00:00                 CANDY                           Seybol

d

 

        2022 Outpatient         GABI CORNELIUS  [id number]

047 Gabi



        00:00:00 00:00:00                 CANDY                           Seybol

d

 

        2022 Outpatient         LAB90   GABI HUDSON  [id number]

39 Gabi



        11:00:00 11:00:00                                                 Seybol

d

 

        2022 Office          [place], [place]    [id number]

3782 Gabi



        10:30:00 10:45:00 Visit           Candy Cain [id number]         Se

ybold



                                        Somogyi         [id number]         



                                                        [phone]         



                                                        0               

 

        2022 Office          [place], [place]    [id number]

8854 Gabi



        10:00:00 10:15:00 Visit           Candy Cain [id number]         Se

ybold



                                        Somogyi         [id number]         



                                                        [phone]         



                                                        0               

 

        2021 Outpatient         GABI CORNELIUS  [id number]

141 Gabi



        10:00:00 10:00:00                 CANDY                           Seybol

d

 

        2021 Outpatient         GABI CORNELIUS  [id number]

054 Gabi



        00:00:00 00:00:00                 CANDY                           Seybol

d

 

        2021-10-25 2021-10-25 Outpatient         GABI CORNELIUS  [id number]

243 Gabi



        00:00:00 00:00:00                 CANDY                           Seybol

d

 

        2021 Outpatient         GABI CORNELIUS  [id number]

332 Gabi



        00:00:00 00:00:00                 CANDY                           Seybol

d

 

        2021 Outpatient         LAB90   GABI HUDSON  [id number]

14 Gabi



        08:50:00 08:50:00                                                 Seybol

d

 

        2021 Outpatient         [id number] GABI HUDSON  [id number]

1814 Gabi



        11:25:00 11:25:00                                                 Seybol

d

 

        [id number] Office          [place] Cornelius    [id number]

5796 Gabi



        09:25:37 09:55:37 Visit           Candy Cain [id number]         

everton Bhandari         [id number]         



                                                        [phone]         



                                                        0               

 

        2021 Outpatient         GABI CORNELIUS  [id number]

775 Gabi



        10:00:00 10:00:00                 CANDY Prescottol

jerry

 

        2021 Outpatient         ASHLI, GABI HUDSON  [id number]

855 Gabi



        10:00:00 10:00:00                 CANDY Prescottol

jerry

 

        [facility]    [id number]

[facility] [facility]



        14:30:00 14:30:00                 LUISA                           [facility]

 

        [facility]    [id number]

150 [address]



        16:00:00 16:00:00                 IZAIAH                         [facility]

 

        [facility]     [id number]

[address]



        23:05:00 23:05:00                 DEANN                         [facility]

 

        2020 Outpatient         [facility]     VFP     796

[phone] [facility]



        03:45:00 03:45:00                 H                       [id number]   Family



                                                                        Practic



                                                                        e

 

        2020 Outpatient         [facility]     [facility]     796

[phone] [place]



        03:45:00 03:45:00                 H                       [id number]   Family



                                                                        Practic



                                                                        e

 

        2020 Outpatient         [facility]     VFP     796

[phone] [place]



        03:45:00 03:45:00                 H                       [id number]   Family



                                                                        Practic



                                                                        e

 

        2020 Outpatient         [facility] [facility]     [facility]FP     796

[phone] [place]



        07:22:00 07:22:00                 _J_AH                   [id number]   Family



                                                                        Practic



                                                                        e

 

        [facility]     [id number]

36 [facility]



        16:24:01 20:35:00                 ANABEL hutchison [facility]







Results

This patient has no known results.
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
